# Patient Record
Sex: MALE | Race: WHITE | Employment: FULL TIME | URBAN - METROPOLITAN AREA
[De-identification: names, ages, dates, MRNs, and addresses within clinical notes are randomized per-mention and may not be internally consistent; named-entity substitution may affect disease eponyms.]

---

## 2018-10-26 ENCOUNTER — OFFICE VISIT (OUTPATIENT)
Dept: ORTHOPEDICS | Facility: CLINIC | Age: 18
End: 2018-10-26

## 2018-10-26 ENCOUNTER — RADIANT APPOINTMENT (OUTPATIENT)
Dept: GENERAL RADIOLOGY | Facility: CLINIC | Age: 18
End: 2018-10-26
Attending: FAMILY MEDICINE

## 2018-10-26 VITALS
HEART RATE: 66 BPM | DIASTOLIC BLOOD PRESSURE: 69 MMHG | SYSTOLIC BLOOD PRESSURE: 139 MMHG | WEIGHT: 157.9 LBS | HEIGHT: 69 IN | BODY MASS INDEX: 23.39 KG/M2

## 2018-10-26 DIAGNOSIS — M79.641 RIGHT HAND PAIN: ICD-10-CM

## 2018-10-26 DIAGNOSIS — S62.356A CLOSED NONDISPLACED FRACTURE OF SHAFT OF FIFTH METACARPAL BONE OF RIGHT HAND, INITIAL ENCOUNTER: ICD-10-CM

## 2018-10-26 DIAGNOSIS — M79.641 RIGHT HAND PAIN: Primary | ICD-10-CM

## 2018-10-26 NOTE — LETTER
Date:October 29, 2018      Patient was self referred, no letter generated. Do not send.        [facility] Health Information

## 2018-10-26 NOTE — MR AVS SNAPSHOT
"              After Visit Summary   10/26/2018    Harry Carr    MRN: [id number]           Patient Information     Date Of Birth          2000        Visit Information        Provider Department      10/26/2018 12:50 PM Lele Diaz DO [facility]        Today's Diagnoses     Right hand pain    -  1    Closed nondisplaced fracture of shaft of fifth metacarpal bone of right hand, initial encounter           Follow-ups after your visit        Your next 10 appointments already scheduled     Oct 31, 2018  1:00 PM CDT   Return Walk In Ortho with DO [facility] ([facility] [place])    [address]  4th [address]  [place] [facility] [place] [address] [phone]              Who to contact     Please call your clinic at [phone] to:    Ask questions about your health    Make or cancel appointments    Discuss your medicines    Learn about your test results    Speak to your doctor            Additional Information About Your Visit        Care EveryWhere ID     This is your Care EveryWhere ID. This could be used by other organizations to access your [place] medical records  [id number]        Your Vitals Were     Pulse Height BMI (Body Mass Index)             66 1.753 m (5' 9\") 23.32 kg/m2          Blood Pressure from Last 3 Encounters:   10/26/18 139/69    Weight from Last 3 Encounters:   10/26/18 71.6 kg (157 lb 14.4 oz) (54 %)*     * Growth percentiles are based on CDC 2-20 Years data.               Primary Care Provider    None Specified       No primary provider on file.        Equal Access to Services     DOUG JACKSON AH: Hadii ramona Alvarez, stella jim, kathy lambertalnai lopez. So [facility] [place] [facility] [phone].    ATENCIÓN: Si habla español, tiene a fulton disposición servicios gratuitos de asistencia lingüística. Llame al [phone].    We comply with " applicable federal civil rights laws and [place] laws. We do not discriminate on the basis of race, color, national origin, age, disability, sex, sexual orientation, or gender identity.            Thank you!     Thank you for choosing [facility]ORTS AND ORTHOPAEDIC WALK IN CLINIC  for your care. Our goal is always to provide you with excellent care. Hearing back from our patients is one way we can continue to improve our services. Please take a few minutes to complete the written survey that you may receive in the mail after your visit with us. Thank you!             Your Updated Medication List - Protect others around you: Learn how to safely use, store and throw away your medicines at www.disposemymeds.org.      Notice  As of 10/26/2018 11:59 PM    You have not been prescribed any medications.

## 2018-10-26 NOTE — NURSING NOTE
Relevant Diagnosis: Right fifth metacarpal fracture    Ulnar gutter splint application and care    Type of Cast applied: ulnar gutter   Cast/Splinting material used: orthoglass    Person(s) involved in teaching:   Patient and girlfriend     Motivation Level:  Asks Questions: Yes  Eager to Learn: Yes  Cooperative: Yes  Receptive (willing/able to accept information): Yes     Patient demonstrates understanding of the following:   Reason for the appointment, diagnosis and treatment plan: Yes    Which situations necessitate calling provider and whom to contact: Yes     Teaching Concerns Addressed:   Comments: Try to avoid getting wet, cover it with bag. Can loosen ace wraps once splint is dry and hard.      Proper use and care of ulnar gutter splint: Yes  Pain management techniques: Yes  Wound Care: NA  How and/when to access community resources: NA     Splint was applied in standard [place]:  Yes  Splint fit well:  Yes  Patient reports splint to fit comfortably:  Yes    Instructional Materials Used/Given: Verbal instructions

## 2018-10-26 NOTE — LETTER
10/26/2018       RE: Harry Carr  [address]  [place] [address]     Dear Colleague,    Thank you for referring your patient, Harry Carr, to the [facility] SPORTS AND ORTHOPAEDIC WALK IN CLINIC at [facility]. Please see a copy of my visit note below.    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 The patient reports high diving earlier today when he abducted his fifth finger with the impact of the water that immediately onset shooting pain that was localized in his fifth metacarpal. He also experiences swelling in the ulnar aspect of his hand. His pain is exacerbated with active movement. His pain is alleviated with ice and a mariel splint. He voices no further concerns at this time. Denies numbness, tingling of digit.  Able to bend fingers with discomfort.    Review of Systems:   A 10-point review of systems was obtained and is negative except for as noted in the HPI.     Medications:   No current outpatient prescriptions on file.    Allergies: None    Past Medical History: No medical problems    Social History:  Patient is a professional diver.       Social History     Social History     Marital status: Single     Spouse name: N/A     Number of children: N/A     Years of education: N/A     Social History Main Topics     Smoking status: Not on file     Smokeless tobacco: Not on file     Alcohol use Not on file     Drug use: Not on file     Sexual activity: Not on file     Other Topics Concern     Not on file     Social History Narrative     Family History:  No family history on file.    Physical  "Examination:  Blood pressure 139/69, pulse 66, height 1.753 m (5' 9\"), weight 71.6 kg (157 lb 14.4 oz).  General  - normal appearance, in no obvious distress  HEENT  -Pupils equal, round, no conjunctival injection.  No lid lag  CV  - normal radial pulse  Pulm  - normal respiratory pattern, non-labored  Musculoskeletal - fifth finger  - inspection: Marked swelling at the shaft of the fourth and fifth metacarpals into the MCP region.   - palpation: Tenderness to palpation at fifth metacarpal shaft   - ROM:  MCP  limited active ROM due to pain    deg flexion 0 deg extension   DIP 80 deg flexion  0 deg extension   No scissoring of digits   - strength: 5/5  strength, 5/5 wrist abduction, 5/5 flexion, extension, pronation, supination, adduction  Neuro  - no numbness, no motor deficit, grossly normal coordination, normal muscle tone  Skin  - no ecchymosis, erythema, warmth, or induration, no obvious rash  Psych  - interactive, appropriate, normal mood and affect    Imaging:   Radiographs of the right hand - 3 views (10/26/2018)  minimally displaced oblique fracture of the fifth metacarpal.     I have independently reviewed the above imaging studies; the results were discussed with the patient.     Assessment:   17 year old, right handed male present with acute right hand pain after bending his finger during a high dive today.     Diagnosis: Oblique fifth metacarpal fracture of the right hand.     Plan:   - Recommended ibuprofen and elevation to reduce to swelling  - Patient obtained an ulnar gutter splint in clinic today. We will explore options for waterproof splinting  - OT for wrist based thermoplast splint for diving competition  - Follow up with me next Wednesday for reevaluation before he leaves for a diving competition.     I, Lele Diaz, , have reviewed the above note and agree with the scribe's notation as written.    Scribe Disclosure:   I, Jose Toure, am serving as a scribe to document services " personally performed by Lele Diaz, DO at this visit, based upon the provider's statements to me. All documentation has been reviewed by the aforementioned provider prior to being entered into the official medical record.            SPORTS & ORTHOPEDIC WALK-IN VISIT 10/26/2018    Primary Care Physician: Dr. Duque at the pool, tried to grab his hand for a dive. Felt his pinky finger either jam or get pulled out.     Reason for visit:     What part of your body is injured / painful?  right hand    What caused the injury /pain? Diving    How long ago did your injury occur or pain begin? today    What are your most bothersome symptoms? Pain and Swelling    How would you characterize your symptom?  aching and dull    What makes your symptoms better? Ice and Other: mariel splint    What makes your symptoms worse? Active Movement    Have you been previously seen for this problem? No    Medical History:    Important medical history: No    No family history    Any new medication prescribed since last visit? No    Have you had surgery on this body part before? No    Social History:    Occupation: student    Handedness: Right    Exercise: Most days/week    Review of Systems:    Do you have fever, chills, weight loss? No    Do you have any vision problems? No    Do you have any chest pain or edema? No    Do you have any shortness of breath or wheezing?  No    Do you have stomach problems? No    Do you have any numbness or focal weakness? No    Do you have diabetes? No    Do you have problems with bleeding or clotting? No    Do you have an rashes or other skin lesions? No         Again, thank you for allowing me to participate in the care of your patient.      Sincerely,    Lele Diaz, DO

## 2018-10-26 NOTE — PROGRESS NOTES
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 The patient reports high diving earlier today when he abducted his fifth finger with the impact of the water that immediately onset shooting pain that was localized in his fifth metacarpal. He also experiences swelling in the ulnar aspect of his hand. His pain is exacerbated with active movement. His pain is alleviated with ice and a mariel splint. He voices no further concerns at this time. Denies numbness, tingling of digit.  Able to bend fingers with discomfort.    Review of Systems:   A 10-point review of systems was obtained and is negative except for as noted in the HPI.     Medications:   No current outpatient prescriptions on file.    Allergies: None    Past Medical History: No medical problems    Social History:  Patient is a professional diver.       Social History     Social History     Marital status: Single     Spouse name: N/A     Number of children: N/A     Years of education: N/A     Social History Main Topics     Smoking status: Not on file     Smokeless tobacco: Not on file     Alcohol use Not on file     Drug use: Not on file     Sexual activity: Not on file     Other Topics Concern     Not on file     Social History Narrative     Family History:  No family history on file.    Physical Examination:  Blood pressure 139/69, pulse 66, height 1.753 m (5' 9\"), weight 71.6 kg (157 lb 14.4 oz).  General  - normal appearance, in no obvious distress  HEENT  -Pupils equal, round, no conjunctival injection.  No lid lag  CV  - normal radial pulse  Pulm  - normal respiratory pattern, " non-labored  Musculoskeletal - fifth finger  - inspection: Marked swelling at the shaft of the fourth and fifth metacarpals into the MCP region.   - palpation: Tenderness to palpation at fifth metacarpal shaft   - ROM:  MCP  limited active ROM due to pain    deg flexion 0 deg extension   DIP 80 deg flexion  0 deg extension   No scissoring of digits   - strength: 5/5  strength, 5/5 wrist abduction, 5/5 flexion, extension, pronation, supination, adduction  Neuro  - no numbness, no motor deficit, grossly normal coordination, normal muscle tone  Skin  - no ecchymosis, erythema, warmth, or induration, no obvious rash  Psych  - interactive, appropriate, normal mood and affect    Imaging:   Radiographs of the right hand - 3 views (10/26/2018)  minimally displaced oblique fracture of the fifth metacarpal.     I have independently reviewed the above imaging studies; the results were discussed with the patient.     Assessment:   17 year old, right handed male present with acute right hand pain after bending his finger during a high dive today.     Diagnosis: Oblique fifth metacarpal fracture of the right hand.     Plan:   - Recommended ibuprofen and elevation to reduce to swelling  - Patient obtained an ulnar gutter splint in clinic today. We will explore options for waterproof splinting  - OT for wrist based thermoplast splint for diving competition  - Follow up with me next Wednesday for reevaluation before he leaves for a diving competition.     I, Lele Diaz DO, have reviewed the above note and agree with the scribe's notation as written.    Scribe Disclosure:   IJose, am serving as a scribe to document services personally performed by Lele Diaz DO at this visit, based upon the provider's statements to me. All documentation has been reviewed by the aforementioned provider prior to being entered into the official medical record.

## 2018-10-26 NOTE — PROGRESS NOTES
SPORTS & ORTHOPEDIC WALK-IN VISIT 10/26/2018    Primary Care Physician: Dr. Duque at the pool, tried to grab his hand for a dive. Felt his pinky finger either jam or get pulled out.     Reason for visit:     What part of your body is injured / painful?  right hand    What caused the injury /pain? Diving    How long ago did your injury occur or pain begin? today    What are your most bothersome symptoms? Pain and Swelling    How would you characterize your symptom?  aching and dull    What makes your symptoms better? Ice and Other: mariel splint    What makes your symptoms worse? Active Movement    Have you been previously seen for this problem? No    Medical History:    Important medical history: No    No family history    Any new medication prescribed since last visit? No    Have you had surgery on this body part before? No    Social History:    Occupation: student    Handedness: Right    Exercise: Most days/week    Review of Systems:    Do you have fever, chills, weight loss? No    Do you have any vision problems? No    Do you have any chest pain or edema? No    Do you have any shortness of breath or wheezing?  No    Do you have stomach problems? No    Do you have any numbness or focal weakness? No    Do you have diabetes? No    Do you have problems with bleeding or clotting? No    Do you have an rashes or other skin lesions? No

## 2018-10-31 ENCOUNTER — THERAPY VISIT (OUTPATIENT)
Dept: OCCUPATIONAL THERAPY | Facility: CLINIC | Age: 18
End: 2018-10-31

## 2018-10-31 ENCOUNTER — RADIANT APPOINTMENT (OUTPATIENT)
Dept: GENERAL RADIOLOGY | Facility: CLINIC | Age: 18
End: 2018-10-31
Attending: FAMILY MEDICINE
Payer: COMMERCIAL

## 2018-10-31 ENCOUNTER — OFFICE VISIT (OUTPATIENT)
Dept: ORTHOPEDICS | Facility: CLINIC | Age: 18
End: 2018-10-31

## 2018-10-31 VITALS — WEIGHT: 157 LBS | BODY MASS INDEX: 23.25 KG/M2 | HEIGHT: 69 IN | RESPIRATION RATE: 16 BRPM

## 2018-10-31 DIAGNOSIS — S62.309A CLOSED FRACTURE OF METACARPAL BONE: ICD-10-CM

## 2018-10-31 DIAGNOSIS — S62.356D CLOSED NONDISPLACED FRACTURE OF SHAFT OF FIFTH METACARPAL BONE OF RIGHT HAND WITH ROUTINE HEALING, SUBSEQUENT ENCOUNTER: ICD-10-CM

## 2018-10-31 DIAGNOSIS — M79.644 PAIN OF FINGER OF RIGHT HAND: Primary | ICD-10-CM

## 2018-10-31 DIAGNOSIS — S62.356D CLOSED NONDISPLACED FRACTURE OF SHAFT OF FIFTH METACARPAL BONE OF RIGHT HAND WITH ROUTINE HEALING, SUBSEQUENT ENCOUNTER: Primary | ICD-10-CM

## 2018-10-31 PROCEDURE — 97760 ORTHOTIC MGMT&TRAING 1ST ENC: CPT | Mod: GO | Performed by: OCCUPATIONAL THERAPIST

## 2018-10-31 NOTE — PROGRESS NOTES
Hand Therapy Initial Evaluation    Current Date:  10/31/2018    Diagnosis: R Oblique fifth metacarpal fracture of the right hand     Date of onset: 10/26/18  Procedure:  Ulnar gutter orthoglass splint placed 10/26/18  Referring MD: Lele Serra MD    Precautions:no , pinch    Subjective:  Harry Carr is a 17 year old R hand dominant male.    Patient reports symptoms of pain, stiffness/loss of motion, weakness/loss of strength and edema of the right SF which occurred due to fracture while high diving. Since onset symptoms are Gradually getting better.  Special tests:  x-ray.  Previous treatment: orthoglass ulnar gutter per MD/ATC.    General health as reported by patient is excellent.  Pertinent medical history includes:None  Medical allergies:none.  Surgical history: none.  Medication history: None.    Occupational Profile Information:  Current occupation is student, high diver  Prior functional level:  no limitations  Barriers include:none  Mobility: No difficulty  Transportation: drives    Objective:  Pain Level Report  VAS(0-10) 10/31/2018   At Rest: 0-2 mild   With Use: 2     Report of Pain:  Location:  hand, ring finger and small finger  Pain Quality:  Aching  Frequency: intermittent    Pain is worst:  daytime  Exacerbated by:  varies  Relieved by:  rest  Progression:  improving  Edema:  MILD of the SF and R hand, mild ecchymosis noted to dorsal hand and fingers  Sensation: WNL throughout all nerve distributions; per patient report    ROM:  Wrist: WNL bilaterally  Fingers: WNL with exception of R 5th SF    Strength:     Deferred    Assessment:  Patient presents with symptoms consistent with diagnosis of R 5th metacarpal fx,  with non-surgical intervention.     Patient's limitations or Problem List includes:  Pain, Decreased ROM/motion, Increased edema and Weakness of the right small finger which interferes with the patient's ability to perform Self Care Tasks (dressing), Work Tasks, Recreational  Activities, Household Chores and Driving  as compared to previous level of function.    Rehab Potential:  Excellent - Return to full activity, no limitations    Patient will benefit from skilled Occupational Therapy to increase protection of hand to allow for healing and diving competition and decrease pain and edema to return to previous activity level and resume normal daily tasks and to reach their rehab potential.    Barriers to Learning:  No barrier    Communication Issues:  Patient appears to be able to clearly communicate and understand verbal and written communication and follow directions correctly.    Chart Review: Brief history including review of medical and/or therapy records relating to the presenting problem and Simple history review with patient    Identified Performance Deficits: dressing, hygiene and grooming, home establishment and management, meal preparation and cleanup, shopping, school and leisure activities    Assessment of Occupational Performance:  3-5 Performance Deficits    Clinical Decision Making (Complexity): Low complexity    Treatment Explanation:  The following has been discussed with the patient:  RX ordered/plan of care  Anticipated outcomes  Possible risks and side effects    Plan:  Frequency:  1 X week, once daily  Duration:  for 1 weeks    Treatment Plan:    Orthotic Fabrication:  Static orthosis and Forearm based orthosis  Discharge Plan:  Achieve all LTG.  Independent in home treatment program.  Reach maximal therapeutic benefit.    Home Exercise Program:  Ulnar gutter orthosis with R SF, RF included to wear for diving and at all times except for hygiene

## 2018-10-31 NOTE — PROGRESS NOTES
SPORTS & ORTHOPEDIC WALK-IN FOLLOW-UP VISIT 10/31/2018    Interval History:     Follow up reason: recheck fracture    Date of injury: 10/26/18    Date last seen: 10/26/18    Following Therapeutic Plan: Yes     Pain: Improving    Function: NA    Interval History: Loosened the splint, otherwise kept it on. Not really any pain after the first few days.      Medical History:    Any recent changes to your medical history? No    Any new medication prescribed since last visit? No    Review of Systems:    Do you have fever, chills, weight loss? No    Do you have any vision problems? No    Do you have any chest pain or edema? No    Do you have any shortness of breath or wheezing?  No    Do you have stomach problems? No    Do you have any numbness or focal weakness? No    Do you have diabetes? No    Do you have problems with bleeding or clotting? No    Do you have an rashes or other skin lesions? No

## 2018-10-31 NOTE — MR AVS SNAPSHOT
"              After Visit Summary   10/31/2018    Harry Carr    MRN: [id number]           Patient Information     Date Of Birth          2000        Visit Information        Provider Department      10/31/2018 1:00 PM Lele Diaz DO [facility]         Follow-ups after your visit        Your next 10 appointments already scheduled     Nov 24, 2018  2:00 PM CST   Return Walk In Ortho with DO [facility] ([facility] [place] [facility])    [address] [place] [address] [phone]              Who to contact     Please call your clinic at [phone] to:    Ask questions about your health    Make or cancel appointments    Discuss your medicines    Learn about your test results    Speak to your doctor            Additional Information About Your Visit        Care EveryWhere ID     This is your Care EveryWhere ID. This could be used by other organizations to access your [place] medical records  [id number]        Your Vitals Were     Respirations Height BMI (Body Mass Index)             16 1.753 m (5' 9\") 23.18 kg/m2          Blood Pressure from Last 3 Encounters:   10/26/18 139/69    Weight from Last 3 Encounters:   10/31/18 71.2 kg (157 lb) (53 %)*   10/26/18 71.6 kg (157 lb 14.4 oz) (54 %)*     * Growth percentiles are based on CDC 2-20 Years data.              Today, you had the following     No orders found for display       Primary Care Provider Fax #    Physician No Ref-Primary [phone]       No address on file        Equal Access to Services     DOUG JACKSON : Hadii ramona guillaumeo Sojessica, waaxda luqadaha, qaybta kaalmada adeegyada, waxmiki agnes perez . So [facility] [phone].    ATENCIÓN: Si habla español, tiene a fulton disposición servicios gratuitos de asistencia lingüística. Llame al [phone].    We comply with applicable federal civil rights " laws and [place] laws. We do not discriminate on the basis of race, color, national origin, age, disability, sex, sexual orientation, or gender identity.            Thank you!     Thank you for choosing [facility] SPORTS AND ORTHOPAEDIC WALK IN CLINIC  for your care. Our goal is always to provide you with excellent care. Hearing back from our patients is one way we can continue to improve our services. Please take a few minutes to complete the written survey that you may receive in the mail after your visit with us. Thank you!             Your Updated Medication List - Protect others around you: Learn how to safely use, store and throw away your medicines at www.disposemymeds.org.      Notice  As of 10/31/2018  2:46 PM    You have not been prescribed any medications.

## 2018-10-31 NOTE — LETTER
Date:November 5, 2018      Patient was self referred, no letter generated. Do not send.        [facility] Health Information
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
## 2018-10-31 NOTE — MR AVS SNAPSHOT
After Visit Summary   10/31/2018    Harry Carr    MRN: [id number]           Patient Information     Date Of Birth          2000        Visit Information        Provider Department      10/31/2018 2:00 PM Faiza Sarabia OT [facility] Hand Therapy        Today's Diagnoses     Pain of finger of right hand    -  1    Closed fracture of metacarpal bone           Follow-ups after your visit        Your next 10 appointments already scheduled     Nov 24, 2018  2:00 PM CST   Return Walk In Ortho with Lele Diaz DO   [facility] Sports and Orthopaedic Walk In Clinic ([facility])    [address] [place] [id number] [phone]              Who to contact     If you have questions or need follow up information about today's clinic visit or your schedule please contact [facility] HAND THERAPY directly at [phone].  Normal or non-critical lab and imaging results will be communicated to you by MyChart, letter or phone within 4 business days after the clinic has received the results. If you do not hear from us within 7 days, please contact the clinic through MyChart or phone. If you have a critical or abnormal lab result, we will notify you by phone as soon as possible.  Submit refill requests through [employer] or call your pharmacy and they will forward the refill request to us. Please allow 3 business days for your refill to be completed.          Additional Information About Your Visit        Care EveryWhere ID     This is your Care EveryWhere ID. This could be used by other organizations to access your [facility] medical records  [id number]         Blood Pressure from Last 3 Encounters:   10/26/18 139/69    Weight from Last 3 Encounters:   10/31/18 71.2 kg (157 lb) (53 %)*   10/26/18 71.6 kg (157 lb 14.4 oz) (54 %)*     * Growth percentiles are based on CDC 2-20 Years data.              We Performed the Following     ORTHOTIC MGMT AND TRAINING, EACH 15  MIN        Primary Care Provider Fax #    Physician No Ref-Primary [phone]       No address on file        Equal Access to Services     DOUG JACKSON : Hadii aad ku hadkrystal Alvarez, stella raynaleelee, kathy clark, nai lesliin hayaarosa callesdeedee solano layesikarosa gold. So [facility] [phone].    ATENCIÓN: Si habla español, tiene a fulton disposición servicios gratuitos de asistencia lingüística. Llame al [phone].    We comply with applicable federal civil rights laws and [place] laws. We do not discriminate on the basis of race, color, national origin, age, disability, sex, sexual orientation, or gender identity.            Thank you!     Thank you for choosing [facility]  for your care. Our goal is always to provide you with excellent care. Hearing back from our patients is one way we can continue to improve our services. Please take a few minutes to complete the written survey that you may receive in the mail after your visit with us. Thank you!             Your Updated Medication List - Protect others around you: Learn how to safely use, store and throw away your medicines at www.disposemymeds.org.      Notice  As of 10/31/2018  3:30 PM    You have not been prescribed any medications.

## 2019-01-21 PROBLEM — M79.644 PAIN OF FINGER OF RIGHT HAND: Status: RESOLVED | Noted: 2018-10-31 | Resolved: 2019-01-21

## 2019-01-21 PROBLEM — S62.309A CLOSED FRACTURE OF METACARPAL BONE: Status: RESOLVED | Noted: 2018-10-31 | Resolved: 2019-01-21

## 2022-02-01 ENCOUNTER — TRANSFERRED RECORDS (OUTPATIENT)
Dept: HEALTH INFORMATION MANAGEMENT | Facility: CLINIC | Age: 22
End: 2022-02-01

## 2022-02-02 ENCOUNTER — OFFICE VISIT (OUTPATIENT)
Dept: FAMILY MEDICINE | Facility: CLINIC | Age: 22
End: 2022-02-02

## 2022-02-02 VITALS
OXYGEN SATURATION: 98 % | SYSTOLIC BLOOD PRESSURE: 122 MMHG | HEART RATE: 72 BPM | BODY MASS INDEX: 23.63 KG/M2 | DIASTOLIC BLOOD PRESSURE: 82 MMHG | WEIGHT: 160 LBS

## 2022-02-02 DIAGNOSIS — R41.840 ATTENTION DEFICIT: Primary | ICD-10-CM

## 2022-02-02 PROCEDURE — 99203 OFFICE O/P NEW LOW 30 MIN: CPT | Performed by: NURSE PRACTITIONER

## 2022-02-02 NOTE — PROGRESS NOTES
Assessment & Plan     Attention deficit  Describes difficulty with task completion and concentration going back at least the last 3 to 5 years.  Has noticed difficulty in his day-to-day activities, especially work.    He recently had a consultation with a psychologist who told him that he did appear to meet criteria for ADD.  The psychologist apparently works out of [place].    I told him that we should have him formally evaluated by one of our staff psychologists for documentation.  He also needs to establish care with a PCP for ongoing management.    He lives in [facility] [place] [facility] [place] [facility] [place]; we will see if we can help facilitate an establish care appointment with new PCP in a clinic closer to his residence.    - Adult Mental Health  Referral; Future    Review of external notes as documented elsewhere in note  16 minutes spent on the date of the encounter doing chart review, history and exam, documentation and further activities per the note     See Patient Instructions    [facility]    Eneida Mcdonald is a 21 year old who presents for the following health issues     HPI     Patient is originally from [place].  [place].    Works as an  of a club diving team and says that over the last couple of years he has had difficulty organizing tasks and getting things completed on time.    Sometimes bills or not getting paid.    He has had friends and family members mentioned that he could have a problem with attention deficit disorder.    He actually recently had his mother-in-law refer him to a psychologist in [place].  He had a phone call with that psychologist recently and was told that he appeared to meet criteria for ADD.  That psychologist recommended that he start medication.    The patient does not have physical documentation with him but does have a digital copy of the letter on  his cell phone to show to me today.    He does not currently have a PCP.  It does not sound like he has insurance      Review of Systems   Constitutional, HEENT, cardiovascular, pulmonary, gi and gu systems are negative, except as otherwise noted.      Objective    /82 (BP Location: Left arm, Patient Position: Sitting, Cuff Size: Adult Regular)   Pulse 72   Wt 72.6 kg (160 lb)   SpO2 98%   BMI 23.63 kg/m    Body mass index is 23.63 kg/m .  Physical Exam     Patient is alert oriented and in no acute distress